# Patient Record
Sex: MALE | Race: WHITE | Employment: OTHER | URBAN - METROPOLITAN AREA
[De-identification: names, ages, dates, MRNs, and addresses within clinical notes are randomized per-mention and may not be internally consistent; named-entity substitution may affect disease eponyms.]

---

## 2019-05-21 ENCOUNTER — THERAPY VISIT (OUTPATIENT)
Dept: PHYSICAL THERAPY | Facility: CLINIC | Age: 74
End: 2019-05-21
Payer: MEDICARE

## 2019-05-21 DIAGNOSIS — M25.511 ACUTE PAIN OF RIGHT SHOULDER: ICD-10-CM

## 2019-05-21 PROCEDURE — 97110 THERAPEUTIC EXERCISES: CPT | Mod: GP

## 2019-05-21 PROCEDURE — 97161 PT EVAL LOW COMPLEX 20 MIN: CPT | Mod: GP

## 2019-05-21 NOTE — PROGRESS NOTES
[facility] Initial Evaluation  Subjective:    Eriberto Bermudez is a 73 year old male with a right shoulder condition.  Condition occurred with:  Other.  Condition occurred: other.  This is a recurrent condition  Onset: 2/21/19 but has been longer than that.  Had cortisone injection from Dr. Leal (with [facility]) and sx worse.  Saw Dr. Yeager last week.  OA of shoulder with posterior.     Likes to play tennis, basketball, softball.    Patient reports pain:  In the joint.    Pain is described as aching and sharp and is constant and reported as 5/10.  Associated symptoms:  Loss of motion/stiffness and loss of strength.   Symptoms are exacerbated by certain positions, lifting and lying on extremity and relieved by rest (avoidance).  Since onset symptoms are gradually worsening.  Special testing: none - scheduled for CT in September.                                                    Objective:  System                   Shoulder Evaluation:  ROM:  AROM:    Flexion:  Right:  140    Abduction:  Right:  110    Internal Rotation:  Right:  15  External Rotation:  Right:  30                      Strength:  : R RTC grossly 4+/5 but painful IR.                          Palpation:  Palpation assessed shoulder: crepitus felt with GH motion.                                         General     ROS    Assessment/Plan:    Patient is a 73 year old male with right side shoulder complaints.    Patient has the following significant findings with corresponding treatment plan.                Diagnosis 1:  Advanced OA with posterior wear pattern  Decreased ROM/flexibility - manual therapy and therapeutic exercise  Impaired muscle performance - neuro re-education  Decreased function - therapeutic activities    Therapy Evaluation Codes:       Previous and current functional limitations:  (See Goal Flow Sheet for this information)    Short term and Long term goals: (See Goal Flow Sheet for this information)     Communication  ability:  Patient appears to be able to clearly communicate and understand verbal and written communication and follow directions correctly.  Treatment Explanation - The following has been discussed with the patient:   RX ordered/plan of care  Anticipated outcomes  Possible risks and side effects  This patient would benefit from PT intervention to resume normal activities.   Rehab potential is good.    Frequency:  1 X week, once daily  Duration:  for 6 weeks  Discharge Plan:  Achieve all LTG.  Independent in home treatment program.  Reach maximal therapeutic benefit.    Please refer to the daily flowsheet for treatment today, total treatment time and time spent performing 1:1 timed codes.

## 2019-05-21 NOTE — PROGRESS NOTES
[facility] Initial Evaluation  Subjective:                                         Medical allergies: no.  Other surgeries include:  None reported.  Current medications:  High blood pressure medication.      Primary job tasks include:  Driving and other.                                Objective:  System    Physical Exam    General     ROS    Assessment/Plan:

## 2019-05-21 NOTE — LETTER
DEPARTMENT OF HEALTH AND HUMAN SERVICES  CENTERS FOR MEDICARE & MEDICAID SERVICES    PLAN/UPDATED PLAN OF PROGRESS FOR OUTPATIENT REHABILITATION    PATIENTS NAME:  Eriberto Bemrudez     : 1945    PROVIDER NUMBER:    [id number]    [id number]     PROVIDER NAME: [facility]    MEDICAL RECORD NUMBER: [id number]     START OF CARE DATE:  SOC Date: 19   TYPE:  PT    PRIMARY/TREATMENT DIAGNOSIS: (Pertinent Medical Diagnosis)  Acute pain of right shoulder    VISITS FROM START OF CARE:  Rxs Used: 1     [facility] Initial Evaluation  Subjective:    Eriberto Bermudez is a 73 year old male with a right shoulder condition.  Condition occurred with:  Other.  Condition occurred: other.  This is a recurrent condition  Onset: 19 but has been longer than that.  Had cortisone injection from Dr. Leal (with [facility]) and sx worse.  Saw Dr. Yeager last week.  OA of shoulder with posterior.   Likes to play tennis, basketball, softball.    Patient reports pain:  In the joint.    Pain is described as aching and sharp and is constant and reported as 5/10.  Associated symptoms:  Loss of motion/stiffness and loss of strength.   Symptoms are exacerbated by certain positions, lifting and lying on extremity and relieved by rest (avoidance).  Since onset symptoms are gradually worsening.  Special testing: none - scheduled for CT in September.   Medical allergies: no.  Other surgeries include:  None reported.  Current medications:  High blood pressure medication.      Primary job tasks include:  Driving and other.                       Objective:  Shoulder Evaluation:  ROM:  AROM:    Flexion:  Right:  140  Abduction:  Right:  110  Internal Rotation:  Right:  15  External Rotation:  Right:  30  Strength:  : R RTC grossly 4+/5 but painful IR.  Palpation:  Palpation assessed shoulder: crepitus felt with GH motion.      PATIENTS NAME:  Eriberto Bermudez   :  "1945        Assessment/Plan:    Patient is a 73 year old male with right side shoulder complaints.    Patient has the following significant findings with corresponding treatment plan.                Diagnosis 1:  Advanced OA with posterior wear pattern  Decreased ROM/flexibility - manual therapy and therapeutic exercise  Impaired muscle performance - neuro re-education  Decreased function - therapeutic activities    Therapy Evaluation Codes:   Previous and current functional limitations:  (See Goal Flow Sheet for this information)    Short term and Long term goals: (See Goal Flow Sheet for this information)   Communication ability:  Patient appears to be able to clearly communicate and understand verbal and written communication and follow directions correctly.  Treatment Explanation - The following has been discussed with the patient:   RX ordered/plan of care  Anticipated outcomes  Possible risks and side effects  This patient would benefit from PT intervention to resume normal activities.   Rehab potential is good.  Frequency:  1 X week, once daily  Duration:  for 6 weeks  Discharge Plan:  Achieve all LTG.  Independent in home treatment program.  Reach maximal therapeutic benefit.        Caregiver Signature/Credentials _____________________________ Date ________       Treating Provider: Grant Poon, PT   I have reviewed and certified the need for these services and plan of treatment while under my care.        PHYSICIAN'S SIGNATURE:   _________________________________________  Date___________   Jonathan Yeager MD     Certification period:  Beginning of Cert date period: 05/21/19 to  End of Cert period date: 08/18/19     Functional Level Progress Report: Please see attached \"Goal Flow sheet for Functional level.\"    ____X____ Continue Services or       ________ DC Services                Service dates: From  SOC Date: 05/21/19 date to present                         "

## 2019-05-31 ENCOUNTER — THERAPY VISIT (OUTPATIENT)
Dept: PHYSICAL THERAPY | Facility: CLINIC | Age: 74
End: 2019-05-31
Payer: MEDICARE

## 2019-05-31 DIAGNOSIS — M25.511 ACUTE PAIN OF RIGHT SHOULDER: ICD-10-CM

## 2019-05-31 PROCEDURE — 97112 NEUROMUSCULAR REEDUCATION: CPT | Mod: GP

## 2019-05-31 PROCEDURE — 97110 THERAPEUTIC EXERCISES: CPT | Mod: GP

## 2019-06-04 ENCOUNTER — THERAPY VISIT (OUTPATIENT)
Dept: PHYSICAL THERAPY | Facility: CLINIC | Age: 74
End: 2019-06-04
Payer: MEDICARE

## 2019-06-04 DIAGNOSIS — M25.511 ACUTE PAIN OF RIGHT SHOULDER: ICD-10-CM

## 2019-06-04 PROCEDURE — 97112 NEUROMUSCULAR REEDUCATION: CPT | Mod: GP

## 2019-06-04 PROCEDURE — 97110 THERAPEUTIC EXERCISES: CPT | Mod: GP

## 2019-06-25 ENCOUNTER — THERAPY VISIT (OUTPATIENT)
Dept: PHYSICAL THERAPY | Facility: CLINIC | Age: 74
End: 2019-06-25
Payer: MEDICARE

## 2019-06-25 DIAGNOSIS — M25.511 ACUTE PAIN OF RIGHT SHOULDER: ICD-10-CM

## 2019-06-25 PROCEDURE — 97112 NEUROMUSCULAR REEDUCATION: CPT | Mod: GP

## 2019-06-25 PROCEDURE — 97110 THERAPEUTIC EXERCISES: CPT | Mod: GP

## 2019-08-28 PROBLEM — M25.511 ACUTE PAIN OF RIGHT SHOULDER: Status: RESOLVED | Noted: 2019-05-21 | Resolved: 2019-08-28
